# Patient Record
Sex: MALE | Race: ASIAN | NOT HISPANIC OR LATINO | Employment: FULL TIME | URBAN - METROPOLITAN AREA
[De-identification: names, ages, dates, MRNs, and addresses within clinical notes are randomized per-mention and may not be internally consistent; named-entity substitution may affect disease eponyms.]

---

## 2021-09-10 ENCOUNTER — OCCUPATIONAL MEDICINE (OUTPATIENT)
Dept: OCCUPATIONAL MEDICINE | Facility: CLINIC | Age: 36
End: 2021-09-10
Payer: COMMERCIAL

## 2021-09-10 ENCOUNTER — HOSPITAL ENCOUNTER (OUTPATIENT)
Facility: MEDICAL CENTER | Age: 36
End: 2021-09-10
Attending: NURSE PRACTITIONER
Payer: COMMERCIAL

## 2021-09-10 VITALS
WEIGHT: 142.2 LBS | TEMPERATURE: 99.2 F | HEART RATE: 69 BPM | RESPIRATION RATE: 18 BRPM | OXYGEN SATURATION: 94 % | DIASTOLIC BLOOD PRESSURE: 64 MMHG | SYSTOLIC BLOOD PRESSURE: 114 MMHG | HEIGHT: 65 IN | BODY MASS INDEX: 23.69 KG/M2

## 2021-09-10 DIAGNOSIS — Z77.21 EXPOSURE TO BLOOD OR BODY FLUID: ICD-10-CM

## 2021-09-10 PROCEDURE — 80053 COMPREHEN METABOLIC PANEL: CPT

## 2021-09-10 PROCEDURE — 86803 HEPATITIS C AB TEST: CPT

## 2021-09-10 PROCEDURE — 86704 HEP B CORE ANTIBODY TOTAL: CPT

## 2021-09-10 PROCEDURE — 86706 HEP B SURFACE ANTIBODY: CPT

## 2021-09-10 PROCEDURE — 87389 HIV-1 AG W/HIV-1&-2 AB AG IA: CPT

## 2021-09-10 PROCEDURE — 99213 OFFICE O/P EST LOW 20 MIN: CPT | Performed by: NURSE PRACTITIONER

## 2021-09-10 PROCEDURE — 85027 COMPLETE CBC AUTOMATED: CPT

## 2021-09-10 PROCEDURE — 87340 HEPATITIS B SURFACE AG IA: CPT

## 2021-09-10 NOTE — LETTER
"EMPLOYEE’S CLAIM FOR COMPENSATION/ REPORT OF INITIAL TREATMENT  FORM C-4    EMPLOYEE’S CLAIM - PROVIDE ALL INFORMATION REQUESTED   First Name  Riddhi Last Name  Domi Birthdate                    1985                Sex  male Claim Number (Insurer’s Use Only)    Home Address  [address] Age  35 y.o. 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 (if applicable)  Removing needle from patient    How did this injury or occupational disease occur? (Be specific an answer in detail. Use additional sheet if necessary)     If you believe that you have an occupational disease, when did you first have knowledge of the disability and it relationship to your employment?  n/a Witnesses to the Accident  Staff in OR 07      Nature of Injury or Occupational Disease  Puncture  Part(s) of Body Injured or Affected  Finger (R), ,     I certify that the above is true and correct to the best of my knowledge and that I have provided this information in order to obtain the " benefits of [place]  I hereby authorize any physician, chiropractor, surgeon, practitioner, or other person, any hospital, including [facility], any medical service organization, any insurance company, or other institution or organization to release to each other, any medical or other information, including benefits paid or payable, pertinent to this injury or disease, except information relative to diagnosis, treatment and/or counseling for AIDS, psychological conditions, alcohol or controlled substances, for which I must give specific authorization.  A Photostat of this authorization shall be as valid as the original.     Date   Place Employee’s Original or  *Electronic Signature   THIS REPORT MUST BE COMPLETED AND MAILED WITHIN 3 WORKING DAYS OF TREATMENT   Place  [facility]   Date  9/10/2021 Diagnosis and Description of Injury or Occupational Disease  (Z77.21) Exposure to blood or body fluid Is there evidence the injured employee was under the influence of alcohol and/or another controlled substance at the time of accident?  ? No ? Yes (if yes, please explain)    Hour  4:00 PM   The encounter diagnosis was Exposure to blood or body fluid. No   Treatment  None indicated at this time  Have you advised the patient to remain off work five days or     more?    X-Ray Findings      ? Yes Indicate dates:   From   To      From information given by the employee, together with medical evidence, can        you directly connect this injury or occupational disease as job incurred?  Yes ? No If no, is the injured employee capable of:  ? full duty  Yes ? modified duty      Is additional medical care by a physician indicated?  Yes If Modified Duty, Specify any Limitations / Restrictions      Do you know of any previous injury or disease  "contributing to this condition or occupational disease?  ? Yes ? No (Explain if yes)                          No   Date  9/10/2021 Print Health Care Provider's   ADELAIDE Villalobos I certify the employer’s copy of  this form was mailed on:   Address  [address],   [address] Insurer’s Use Only     [place] Zip  [address]    Provider’s Tax ID Number  [id number] Telephone  Dept: [phone]             Health Care Provider’s Original or Electronic Signature  e-SignWHADINA MORFIN Degree (MD,DO, DC,PAZarinaC,APRN)   APRN      * Complete and attach Release of Information (Form C-4A) when injured employee signs C-4 Form electronically  ORIGINAL - TREATING HEALTHCARE PROVIDER PAGE 2 - INSURER/TPA PAGE 3 - EMPLOYER PAGE 4 - EMPLOYEE             Form C-4 (rev.08/21)           BRIEF DESCRIPTION OF RIGHTS AND BENEFITS  (Pursuant to [place]RS 616C.050)    Notice of Injury or Occupational Disease (Incident Report Form C-1): If an injury or occupational disease (OD) arises out of and in the course of employment, you must provide written notice to your employer as soon as practicable, but no later than 7 days after the accident or OD. Your employer shall maintain a sufficient supply of the required forms.    Claim for Compensation (Form C-4): If medical treatment is sought, the form C-4 is available at the place of initial treatment. A completed \"Claim for Compensation\" (Form C-4) must be filed within 90 days after an accident or OD. The treating physician or chiropractor must, within 3 working days after treatment, complete and mail to the employer, the employer's insurer and third-party , the Claim for Compensation.    Medical Treatment: If you require medical treatment for your on-the-job injury or OD, you may be required to select a physician or chiropractor from a list provided by your workers’ compensation insurer, if it has contracted with an Organization for Managed Care (MCO) or Preferred " Provider Organization (PPO) or providers of health care. If your employer has not entered into a contract with an MCO or PPO, you may select a physician or chiropractor from the Panel of Physicians and Chiropractors. Any medical costs related to your industrial injury or OD will be paid by your insurer.    Temporary Total Disability (TTD): If your doctor has certified that you are unable to work for a period of at least 5 consecutive days, or 5 cumulative days in a 20-day period, or places restrictions on you that your employer does not accommodate, you may be entitled to TTD compensation.    Temporary Partial Disability (TPD): If the wage you receive upon reemployment is less than the compensation for TTD to which you are entitled, the insurer may be required to pay you TPD compensation to make up the difference. TPD can only be paid for a maximum of 24 months.    Permanent Partial Disability (PPD): When your medical condition is stable and there is an indication of a PPD as a result of your injury or OD, within 30 days, your insurer must arrange for an evaluation by a rating physician or chiropractor to determine the degree of your PPD. The amount of your PPD award depends on the date of injury, the results of the PPD evaluation, your age and wage.    Permanent Total Disability (PTD): If you are medically certified by a treating physician or chiropractor as permanently and totally disabled and have been granted a PTD status by your insurer, you are entitled to receive monthly benefits not to exceed 66 2/3% of your average monthly wage. The amount of your PTD payments is subject to reduction if you previously received a lump-sum PPD award.    Vocational Rehabilitation Services: You may be eligible for vocational rehabilitation services if you are unable to return to the job due to a permanent physical impairment or permanent restrictions as a result of your injury or occupational disease.    Transportation and Per  Saray Reimbursement: You may be eligible for travel expenses and per saray associated with medical treatment.    Reopening: You may be able to reopen your claim if your condition worsens after claim closure.     Appeal Process: If you disagree with a written determination issued by the insurer or the insurer does not respond to your request, you may appeal to the Department of Administration, , by following the instructions contained in your determination letter. You must appeal the determination within 70 days from the date of the determination letter at [address], [address], [place], [place] [address], or [address], [place], [place] [address]. If you disagree with the  decision, you may appeal to the Department of Administration, . You must file your appeal within 30 days from the date of the  decision letter at [address], [address], [place], [place] [address], or [address], [facility] [address] [facility] [address], [place], [place] [address]. If you disagree with a decision of an , you may file a petition for judicial review with the District Court. You must do so within 30 days of the Appeal Officer’s decision. You may be represented by an  at your own expense or you may contact the [facility] for possible representation.    [place]  for Injured Workers (NAIW): If you disagree with a  decision, you may request that NAIW represent you without charge at an  Hearing. For information regarding denial of benefits, you may contact the [facility] at: [address], [address], [place] [address], [phone], or [address] [facility] [address], [place] [address], [phone]    To File a Complaint with the Division: If you wish to file a complaint with the  of the Division of Industrial Relations (DIR),  please contact the Workers’ Compensation Section, [facility]  [address], [address], [place], [place] [address], telephone [phone], or [address], [address], [place], [place] [address], telephone [phone].    For assistance with Workers’ Compensation Issues: You may contact the [facility] Office for Consumer Health Assistance, [address], [address], [place], [place] [address], Toll Free 1-986.149.9592, Web site: http://[facility].nv.gov/Programs/DASIA E-mail: dasia@[facility].nv.gov              __________________________________________________________________                                    _________________            Employee Name / Signature                                                                                                                            Date                                                                                                                                                                                                                              D-2 (rev. 10/20)

## 2021-09-10 NOTE — LETTER
[address],   [address] [address]  Phone:  [phone] - Fax:  [phone]   Occupational Health [facility] Progress Report and Disability Certification  Date of Service: 9/10/2021   No Show:  No  Date / Time of Next Visit: 9/13/2021 @ 3:45 PM    Claim Information   Patient Name: Riddhi Duron  Claim Number:     Employer:   [employer] Date of Injury: 9/10/2021     Insurer / TPA: [employer] [employer]  ID / SSN:     Occupation: Neurophysiologist  Diagnosis: The encounter diagnosis was Exposure to blood or body fluid.    Medical Information   Related to Industrial Injury? Yes    Subjective Complaints:  DOI 9/10/21. MARCK: Removing a needle from a patient in the OR. Patient here for post exposure needle stick. Patient remains asymptomatic. Baseline labs drawn at this visit. Source labs reviewed and negative for Hep C and HIV.  Risks and benefits of HIV prophylaxis discussed with patient. Due to low risk nature of injury and review of source labs HIV prophylaxis not recommended. Patient is amenable to this. Patient Tdap is up to date. Plan of care discussed with patient.    Objective Findings: Right index finger: Gross deformity or discoloration noted.  Skin is clean, dry, and intact.   Pre-Existing Condition(s):     Assessment:   Initial Visit    Status: Additional Care Required  Permanent Disability:No    Plan:      Diagnostics:      Comments:  Follow-up Monday post exposure lab review  Full duty  Baseline labs drawn  HIV prophylaxis not recommended as source is negative for hepatitis C and HIV    Disability Information   Status: Released to Full Duty    From:  9/10/2021  Through: 9/13/2021 Restrictions are:     Physical Restrictions   Sitting:    Standing:    Stooping:    Bending:      Squatting:    Walking:    Climbing:    Pushing:      Pulling:    Other:    Reaching Above Shoulder (L):   Reaching Above Shoulder (R):       Reaching Below Shoulder (L):     Reaching Below Shoulder (R):      Not to exceed Weight Limits   Carrying(hrs):   Weight Limit(lb):   Lifting(hrs):   Weight  Limit(lb):     Comments:      Repetitive Actions   Hands: i.e. Fine Manipulations from Grasping:     Feet: i.e. Operating Foot Controls:     Driving / Operate Machinery:     Health Care Provider’s Original or Electronic Signature  ADELAIDE Villalobos Health Care Provider’s Original or Electronic Signature    Rafa Glaser MD       Clinic Name / Location: [address],   [address] [address] Clinic Phone Number: Dept: [phone]   Appointment Time: 4:30 Pm Visit Start Time: 4:00 PM   Check-In Time:  3:54 Pm Visit Discharge Time: 4:22 PM    Original-Treating Physician or Chiropractor    Page 2-Insurer/TPA    Page 3-Employer    Page 4-Employee

## 2021-09-10 NOTE — PROGRESS NOTES
"Subjective:     Riddhi Duron is a 35 y.o. male who presents for Other (Pt. got a  used needle stick on his Rt. index finger as he removed needle electrodes from his paitnet. DOI: 9/10/21 12:10pm (the end of a surgery))      DOI 9/10/21. MARCK: Removing a needle from a patient in the OR. Patient here for post exposure needle stick. Patient remains asymptomatic. Baseline labs drawn at this visit. Source labs reviewed and negative for Hep C and HIV.  Risks and benefits of HIV prophylaxis discussed with patient. Due to low risk nature of injury and review of source labs HIV prophylaxis not recommended. Patient is amenable to this. Patient Tdap is up to date. Plan of care discussed with patient. Patient would still like to continue with baseline lab testing. Source MRN [id number] Negative for Hep C and HIV.    ROS: All systems were reviewed on intake form, form was reviewed and signed. See scanned documents in media. Pertinent positives and negatives included in HPI.    PMH: No pertinent past medical history to this problem  MEDS: Medications were reviewed in Epic  ALLERGIES: No Known Allergies  SOCHX: Works as Neurophysiologist at [employer]  FH: No pertinent family history to this problem       Objective:     /64 (BP Location: Left arm, Patient Position: Sitting, BP Cuff Size: Adult long)   Pulse 69   Temp 37.3 °C (99.2 °F) (Temporal)   Resp 18   Ht 1.65 m (5' 4.96\")   Wt 64.5 kg (142 lb 3.2 oz)   SpO2 94%   BMI 23.69 kg/m²     [unfilled]    Right index finger: Gross deformity or discoloration noted.  Skin is clean, dry, and intact.    Assessment/Plan:       1. Exposure to blood or body fluid  - EXPOSED PERSON-SOURCE PT POSITIVE OR UNK (BLOOD & BODY FLUID EXPOSURE); Future    Released to Full Duty FROM 9/10/2021 TO 9/13/2021     Follow-up Monday post exposure lab review  Full duty  Baseline labs drawn  HIV prophylaxis not recommended as source is negative for hepatitis C and HIV    Differential diagnosis, " natural history, supportive care, and indications for immediate follow-up discussed.    Approximately 25 minutes were spent in reviewing notes, preparing for visit, obtaining history, exam and evaluation, patient counseling/education and post visit documentation/orders.

## 2021-09-11 LAB
ALBUMIN SERPL BCP-MCNC: 4.6 G/DL (ref 3.2–4.9)
ALBUMIN/GLOB SERPL: 1.6 G/DL
ALP SERPL-CCNC: 51 U/L (ref 30–99)
ALT SERPL-CCNC: 10 U/L (ref 2–50)
ANION GAP SERPL CALC-SCNC: 12 MMOL/L (ref 7–16)
AST SERPL-CCNC: 14 U/L (ref 12–45)
BILIRUB SERPL-MCNC: 0.8 MG/DL (ref 0.1–1.5)
BUN SERPL-MCNC: 12 MG/DL (ref 8–22)
CALCIUM SERPL-MCNC: 9.6 MG/DL (ref 8.5–10.5)
CHLORIDE SERPL-SCNC: 102 MMOL/L (ref 96–112)
CO2 SERPL-SCNC: 25 MMOL/L (ref 20–33)
CREAT SERPL-MCNC: 0.82 MG/DL (ref 0.5–1.4)
ERYTHROCYTE [DISTWIDTH] IN BLOOD BY AUTOMATED COUNT: 42.3 FL (ref 35.9–50)
GLOBULIN SER CALC-MCNC: 2.8 G/DL (ref 1.9–3.5)
GLUCOSE SERPL-MCNC: 85 MG/DL (ref 65–99)
HBV CORE AB SERPL QL IA: NONREACTIVE
HBV SURFACE AB SERPL IA-ACNC: 77.9 MIU/ML (ref 0–10)
HBV SURFACE AG SER QL: ABNORMAL
HCT VFR BLD AUTO: 43.7 % (ref 42–52)
HCV AB SER QL: ABNORMAL
HGB BLD-MCNC: 14.5 G/DL (ref 14–18)
HIV 1+2 AB+HIV1 P24 AG SERPL QL IA: ABNORMAL
MCH RBC QN AUTO: 30.5 PG (ref 27–33)
MCHC RBC AUTO-ENTMCNC: 33.2 G/DL (ref 33.7–35.3)
MCV RBC AUTO: 91.8 FL (ref 81.4–97.8)
PLATELET # BLD AUTO: 228 K/UL (ref 164–446)
PMV BLD AUTO: 11.2 FL (ref 9–12.9)
POTASSIUM SERPL-SCNC: 3.8 MMOL/L (ref 3.6–5.5)
PROT SERPL-MCNC: 7.4 G/DL (ref 6–8.2)
RBC # BLD AUTO: 4.76 M/UL (ref 4.7–6.1)
SODIUM SERPL-SCNC: 139 MMOL/L (ref 135–145)
WBC # BLD AUTO: 5.4 K/UL (ref 4.8–10.8)

## 2021-09-13 ENCOUNTER — OCCUPATIONAL MEDICINE (OUTPATIENT)
Dept: OCCUPATIONAL MEDICINE | Facility: CLINIC | Age: 36
End: 2021-09-13
Payer: COMMERCIAL

## 2021-09-13 VITALS — WEIGHT: 142 LBS | BODY MASS INDEX: 24.24 KG/M2 | HEIGHT: 64 IN

## 2021-09-13 DIAGNOSIS — Z77.21 EXPOSURE TO BLOOD OR BODY FLUID: ICD-10-CM

## 2021-09-13 PROCEDURE — 99212 OFFICE O/P EST SF 10 MIN: CPT | Performed by: PREVENTIVE MEDICINE

## 2021-09-13 ASSESSMENT — FIBROSIS 4 INDEX: FIB4 SCORE: 0.64

## 2021-09-13 NOTE — LETTER
[address],   [address] [address]  Phone:  [phone] - Fax:  [phone]   Occupational Health [facility] Progress Report and Disability Certification  Date of Service: 9/13/2021   No Show:  No  Date / Time of Next Visit: 1/13/2022 @ 3:30 PM    Claim Information   Patient Name: Riddhi Duron  Claim Number:     Employer:   [employer] Date of Injury:      Insurer / TPA: [employer]  ID / SSN:     Occupation:  NEUROPHYSIOLOGIST Diagnosis: The encounter diagnosis was Exposure to blood or body fluid.    Medical Information   Related to Industrial Injury? Yes    Subjective Complaints:  DOI 9/10/2021: 36 yo injured worker presents with needlestick injury. MARCK: Removing a needle from a patient in the OR. Source negative. Patient currently asymptomatic here for lab follow up.   Objective Findings: Constitutional: Patient is in no acute distress. Appears well-developed and well-nourished.   HENT: Normocephalic and atraumatic. EOM are normal. No scleral icterus.   Cardiovascular: Normal rate.    Pulmonary/Chest: Effort normal. No respiratory distress.   Neurological: Patient is alert and oriented to person, place, and time.   Skin: Skin is warm and dry.   Psychiatric: Normal mood and affect. Behavior is normal.      Pre-Existing Condition(s):     Assessment:   Condition Same    Status: Additional Care Required  Permanent Disability:No    Plan:      Diagnostics:      Comments:  Source status negative for HIV, Hep B and Hep C  Baseline labs within normal limits. Negative for HIV, Hep B and Hep C. Titers indicate patient immune to Hepatitis B  Patient would like to continue with lab work despite negative testing.  Will repeat   HIV and hepatitis C at 4 months post exposure  Tetanus is up-to-date  Ordered hepatitis C and HIV  Follow-up 4 months, have blood drawn 1 week before next appointment      Disability Information   Status: Released to Full Duty    From:   9/13/2021  Through: 1/13/2022 Restrictions are:     Physical Restrictions   Sitting:    Standing:    Stooping:    Bending:      Squatting:    Walking:    Climbing:    Pushing:      Pulling:    Other:    Reaching Above Shoulder (L):   Reaching Above Shoulder (R):       Reaching Below Shoulder (L):    Reaching Below Shoulder (R):      Not to exceed Weight Limits   Carrying(hrs):   Weight Limit(lb):   Lifting(hrs):   Weight  Limit(lb):     Comments:      Repetitive Actions   Hands: i.e. Fine Manipulations from Grasping:     Feet: i.e. Operating Foot Controls:     Driving / Operate Machinery:     Health Care Provider’s Original or Electronic Signature  Raffi Khalil D.O. Health Care Provider’s Original or Electronic Signature    Rafa Glaser MD       Clinic Name / Location: [address] [address] Clinic Phone Number: Dept: [phone]   Appointment Time: 3:45 Pm Visit Start Time: 3:40 PM   Check-In Time:  3:34 Pm Visit Discharge Time: 4:10 PM    Original-Treating Physician or Chiropractor    Page 2-Insurer/TPA    Page 3-Employer    Page 4-Employee

## 2021-09-13 NOTE — PROGRESS NOTES
"Subjective:     Riddhi Duron is a 35 y.o. 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 MARCK: Removing a needle from a patient in the OR. Source negative. Patient currently asymptomatic here for lab follow up.    ROS       Objective:     Ht 1.626 m (5' 4\")   Wt 64.4 kg (142 lb)   BMI 24.37 kg/m²      Constitutional: Patient is in no acute distress. Appears well-developed and well-nourished.   HENT: Normocephalic and atraumatic. EOM are normal. No scleral icterus.   Cardiovascular: Normal rate.    Pulmonary/Chest: Effort normal. No respiratory distress.   Neurological: Patient is alert and oriented to person, place, and time.   Skin: Skin is warm and dry.   Psychiatric: Normal mood and affect. Behavior is normal.       Assessment/Plan:       1. Exposure to blood or body fluid    Released to Full Duty FROM 9/13/2021 TO 1/13/2022     Source status negative for HIV, Hep B and Hep C  Baseline labs within normal limits. Negative for HIV, Hep B and Hep C. Titers indicate patient immune to Hepatitis B  Patient would like to continue with lab work despite negative testing.  Will repeat   HIV and hepatitis C at 4 months post exposure  Tetanus is up-to-date  Ordered hepatitis C and HIV  Follow-up 4 months, have blood drawn 1 week before next appointment      Differential diagnosis, natural history, supportive care, and indications for immediate follow-up discussed.    Approximately 15 minutes was spent in preparing for visit, obtaining history, exam and evaluation, patient counseling/education and post visit documentation/orders.  "

## 2021-10-21 ENCOUNTER — HOSPITAL ENCOUNTER (OUTPATIENT)
Dept: LAB | Facility: MEDICAL CENTER | Age: 36
End: 2021-10-21
Attending: PREVENTIVE MEDICINE
Payer: COMMERCIAL

## 2021-10-21 DIAGNOSIS — Z77.21 EXPOSURE TO BLOOD OR BODY FLUID: ICD-10-CM

## 2021-10-21 LAB
HCV AB SER QL: NORMAL
HIV 1+2 AB+HIV1 P24 AG SERPL QL IA: NORMAL

## 2021-10-21 PROCEDURE — 86803 HEPATITIS C AB TEST: CPT

## 2021-10-21 PROCEDURE — 36415 COLL VENOUS BLD VENIPUNCTURE: CPT

## 2021-10-21 PROCEDURE — 87389 HIV-1 AG W/HIV-1&-2 AB AG IA: CPT

## 2021-10-28 ENCOUNTER — TELEPHONE (OUTPATIENT)
Dept: SCHEDULING | Facility: IMAGING CENTER | Age: 36
End: 2021-10-28

## 2021-10-29 ENCOUNTER — TELEPHONE (OUTPATIENT)
Dept: SCHEDULING | Facility: IMAGING CENTER | Age: 36
End: 2021-10-29

## 2021-11-08 ENCOUNTER — APPOINTMENT (OUTPATIENT)
Dept: MEDICAL GROUP | Facility: PHYSICIAN GROUP | Age: 36
End: 2021-11-08
Payer: COMMERCIAL

## 2021-11-10 ENCOUNTER — OFFICE VISIT (OUTPATIENT)
Dept: MEDICAL GROUP | Facility: PHYSICIAN GROUP | Age: 36
End: 2021-11-10
Payer: COMMERCIAL

## 2021-11-10 VITALS
BODY MASS INDEX: 22.8 KG/M2 | WEIGHT: 141.9 LBS | TEMPERATURE: 99.3 F | HEART RATE: 64 BPM | DIASTOLIC BLOOD PRESSURE: 50 MMHG | OXYGEN SATURATION: 99 % | SYSTOLIC BLOOD PRESSURE: 98 MMHG | HEIGHT: 66 IN

## 2021-11-10 DIAGNOSIS — Z13.21 ENCOUNTER FOR VITAMIN DEFICIENCY SCREENING: ICD-10-CM

## 2021-11-10 DIAGNOSIS — Z13.29 SCREENING FOR THYROID DISORDER: ICD-10-CM

## 2021-11-10 DIAGNOSIS — Z13.220 LIPID SCREENING: ICD-10-CM

## 2021-11-10 DIAGNOSIS — Z00.00 HEALTH CARE MAINTENANCE: ICD-10-CM

## 2021-11-10 DIAGNOSIS — R10.13 EPIGASTRIC PAIN: ICD-10-CM

## 2021-11-10 DIAGNOSIS — Z13.1 DIABETES MELLITUS SCREENING: ICD-10-CM

## 2021-11-10 DIAGNOSIS — R14.0 ABDOMINAL BLOATING: ICD-10-CM

## 2021-11-10 DIAGNOSIS — Z72.820 SLEEP DEFICIENT: ICD-10-CM

## 2021-11-10 PROCEDURE — 99204 OFFICE O/P NEW MOD 45 MIN: CPT | Performed by: INTERNAL MEDICINE

## 2021-11-10 ASSESSMENT — FIBROSIS 4 INDEX: FIB4 SCORE: 0.64

## 2021-11-10 ASSESSMENT — PATIENT HEALTH QUESTIONNAIRE - PHQ9: CLINICAL INTERPRETATION OF PHQ2 SCORE: 0

## 2021-11-11 NOTE — PROGRESS NOTES
"New Patient to establish    Chief Complaint   Patient presents with   • Establish Care   • GI Problem     x11 months       Subjective:     History of Present Illness: Patient is a 35 y.o. male who is here today to establish primary care    1. Epigastric pain  2. Abdominal bloating  Reported going on for almost 1 year, epigastric pain mainly after meals, sometimes without milk, associated with abdominal bloating, burping, reported possibly lactose intolerance with passing gases, patient denied having alarm GI signs or symptoms, reported had colonoscopy 3 years ago which was normal, would like to be seen by GI for possible upper endoscopy to rule out peptic ulcer disease    Sleep deficiency  Reported busy with 2 jobs currently    No current outpatient medications on file prior to visit.     No current facility-administered medications on file prior to visit.     No Known Allergies  Patient Active Problem List    Diagnosis Date Noted   • Epigastric pain 11/10/2021   • Abdominal bloating 11/10/2021     No past medical history on file.  Past Surgical History:   Procedure Laterality Date   • OTHER  2013    Septoplasty DX: Deviated septum     Family History   Problem Relation Age of Onset   • Hypertension Father      Social History     Tobacco Use   • Smoking status: Never Smoker   • Smokeless tobacco: Never Used   Vaping Use   • Vaping Use: Never used   Substance Use Topics   • Alcohol use: Never   • Drug use: Never       ROS:  All other systems reviewed and are negative except as stated in the HPI       Physical Exam:     BP (!) 98/50 (BP Location: Left arm, Patient Position: Sitting, BP Cuff Size: Adult)   Pulse 64   Temp 37.4 °C (99.3 °F) (Temporal)   Ht 1.676 m (5' 6\")   Wt 64.4 kg (141 lb 14.4 oz)   SpO2 99%   BMI 22.90 kg/m²   General: Normal appearing. No distress.  Pulmonary: Clear to ausculation.  Normal effort.   Cardiovascular: Regular rate and rhythm    Assessment and Plan:     1. Epigastric pain  2. " Abdominal bloating  - CRP HIGH SENSITIVE (CARDIAC); Future  - MAGNESIUM; Future  - ENDOMYSIAL AB IGA; Future  - INFLAMMATORY BOWEL DISEASE DIFF PANEL; Future  - LACTOSE TOLERANCE TEST  - Miscellaneous Test; Future  - PANCREATIC ELASTASE, FECAL; Future  - T-TG IGG; Future  - T-TRANSGLUTAMINASE (TTG) IGA; Future  - H.PYLORI STOOL ANTIGEN; Future  - LIPASE; Future  - US-ABDOMEN COMPLETE SURVEY; Future  -GI referral for upper endoscopy    3. Screening for thyroid disorder  - FREE THYROXINE; Future  - TSH; Future  - T3 FREE; Future  - THYROID PEROXIDASE  (TPO) AB; Future    4. Encounter for vitamin deficiency screening  - VITAMIN D,25 HYDROXY; Future  - VITAMIN B12; Future    5. Health care maintenance  - MAGNESIUM; Future  6. Diabetes mellitus screening  - HEMOGLOBIN A1C; Future  - INSULIN FASTING; Future    7. Lipid screening  - LipoFit by NMR; Future    Follow Up:      Return in about 6 weeks (around 12/22/2021).    Please note that this dictation was created using voice recognition software. I have made every reasonable attempt to correct obvious errors, but I expect that there are errors of grammar and possibly content that I did not discover before finalizing the note.    Signed by: Drea Shepard M.D.

## 2021-11-29 ENCOUNTER — HOSPITAL ENCOUNTER (OUTPATIENT)
Dept: LAB | Facility: MEDICAL CENTER | Age: 36
End: 2021-11-29
Attending: INTERNAL MEDICINE
Payer: COMMERCIAL

## 2021-11-29 DIAGNOSIS — Z00.00 HEALTH CARE MAINTENANCE: ICD-10-CM

## 2021-11-29 DIAGNOSIS — Z13.29 SCREENING FOR THYROID DISORDER: ICD-10-CM

## 2021-11-29 DIAGNOSIS — Z13.21 ENCOUNTER FOR VITAMIN DEFICIENCY SCREENING: ICD-10-CM

## 2021-11-29 DIAGNOSIS — R10.13 EPIGASTRIC PAIN: ICD-10-CM

## 2021-11-29 DIAGNOSIS — Z13.1 DIABETES MELLITUS SCREENING: ICD-10-CM

## 2021-11-29 DIAGNOSIS — Z13.220 LIPID SCREENING: ICD-10-CM

## 2021-11-29 DIAGNOSIS — R14.0 ABDOMINAL BLOATING: ICD-10-CM

## 2021-11-29 LAB
CRP SERPL HS-MCNC: 0.3 MG/L (ref 0–3)
EST. AVERAGE GLUCOSE BLD GHB EST-MCNC: 108 MG/DL
HBA1C MFR BLD: 5.4 % (ref 4–5.6)
LIPASE SERPL-CCNC: 24 U/L (ref 11–82)
MAGNESIUM SERPL-MCNC: 2.1 MG/DL (ref 1.5–2.5)
T3FREE SERPL-MCNC: 2.7 PG/ML (ref 2–4.4)
T4 FREE SERPL-MCNC: 1.4 NG/DL (ref 0.93–1.7)
THYROPEROXIDASE AB SERPL-ACNC: <9 IU/ML (ref 0–9)
TSH SERPL DL<=0.005 MIU/L-ACNC: 1.8 UIU/ML (ref 0.38–5.33)
VIT B12 SERPL-MCNC: 655 PG/ML (ref 211–911)

## 2021-11-29 PROCEDURE — 83516 IMMUNOASSAY NONANTIBODY: CPT

## 2021-11-29 PROCEDURE — 84439 ASSAY OF FREE THYROXINE: CPT

## 2021-11-29 PROCEDURE — 86255 FLUORESCENT ANTIBODY SCREEN: CPT

## 2021-11-29 PROCEDURE — 86671 FUNGUS NES ANTIBODY: CPT

## 2021-11-29 PROCEDURE — 83735 ASSAY OF MAGNESIUM: CPT

## 2021-11-29 PROCEDURE — 83525 ASSAY OF INSULIN: CPT

## 2021-11-29 PROCEDURE — 82607 VITAMIN B-12: CPT

## 2021-11-29 PROCEDURE — 80061 LIPID PANEL: CPT

## 2021-11-29 PROCEDURE — 84481 FREE ASSAY (FT-3): CPT

## 2021-11-29 PROCEDURE — 86141 C-REACTIVE PROTEIN HS: CPT

## 2021-11-29 PROCEDURE — 83690 ASSAY OF LIPASE: CPT

## 2021-11-29 PROCEDURE — 84443 ASSAY THYROID STIM HORMONE: CPT

## 2021-11-29 PROCEDURE — 86376 MICROSOMAL ANTIBODY EACH: CPT

## 2021-11-29 PROCEDURE — 36415 COLL VENOUS BLD VENIPUNCTURE: CPT

## 2021-11-29 PROCEDURE — 83036 HEMOGLOBIN GLYCOSYLATED A1C: CPT

## 2021-11-29 PROCEDURE — 83704 LIPOPROTEIN BLD QUAN PART: CPT

## 2021-11-29 PROCEDURE — 82306 VITAMIN D 25 HYDROXY: CPT

## 2021-12-01 ENCOUNTER — HOSPITAL ENCOUNTER (OUTPATIENT)
Facility: MEDICAL CENTER | Age: 36
End: 2021-12-01
Attending: INTERNAL MEDICINE
Payer: COMMERCIAL

## 2021-12-01 DIAGNOSIS — R14.0 ABDOMINAL BLOATING: ICD-10-CM

## 2021-12-01 DIAGNOSIS — R10.13 EPIGASTRIC PAIN: ICD-10-CM

## 2021-12-01 LAB
H PYLORI AG STL QL IA: NOT DETECTED
INSULIN P FAST SERPL-ACNC: 2 UIU/ML (ref 3–25)

## 2021-12-01 PROCEDURE — 87338 HPYLORI STOOL AG IA: CPT

## 2021-12-01 PROCEDURE — 83520 IMMUNOASSAY QUANT NOS NONAB: CPT

## 2021-12-02 LAB
25(OH)D3 SERPL-MCNC: 24 NG/ML (ref 30–80)
TTG IGA SER IA-ACNC: <2 U/ML (ref 0–3)
TTG IGG SER IA-ACNC: 3 U/ML (ref 0–5)

## 2021-12-03 LAB
ANCA IFA PATTERN Q6048: NORMAL
ANCA IFA TITER Q6047: NORMAL
BAKER'S YEAST IGA QN IA: 3.5 UNITS (ref 0–24.9)
BAKER'S YEAST IGG QN IA: 5.8 UNITS (ref 0–24.9)
CHOLEST SERPL-MCNC: 191 MG/DL
EER INFLAMMATORY BOWEL DISEASES Q6049: NORMAL
ELASTASE PANC STL-MCNT: >800 UG/G
HDL PARTICAL NO Q4363: 29.6 UMOL/L
HDL SIZE Q4361: 9.8 NM
HDLC SERPL-MCNC: 68 MG/DL (ref 40–59)
HLD.LARGE SERPL-SCNC: 11.1 UMOL/L
IBD INTERP Q0082: NORMAL
L VLDL PART NO Q4357: 1.7 NMOL/L
LDL SERPL QN: 21.5 NM
LDL SERPL-SCNC: 929 NMOL/L
LDL SMALL SERPL-SCNC: 165 NMOL/L
LDLC SERPL CALC-MCNC: 110 MG/DL
PATHOLOGY STUDY: ABNORMAL
TRIGL SERPL-MCNC: 63 MG/DL (ref 30–149)
VLDL SIZE Q4362: 47.9 NM

## 2021-12-09 ENCOUNTER — HOSPITAL ENCOUNTER (OUTPATIENT)
Dept: LAB | Facility: MEDICAL CENTER | Age: 36
End: 2021-12-09
Attending: INTERNAL MEDICINE
Payer: COMMERCIAL

## 2021-12-09 DIAGNOSIS — R10.13 EPIGASTRIC PAIN: ICD-10-CM

## 2021-12-09 DIAGNOSIS — R14.0 ABDOMINAL BLOATING: ICD-10-CM

## 2021-12-09 PROCEDURE — 91065 BREATH HYDROGEN/METHANE TEST: CPT | Mod: 91

## 2021-12-09 PROCEDURE — 36415 COLL VENOUS BLD VENIPUNCTURE: CPT

## 2021-12-10 ENCOUNTER — HOSPITAL ENCOUNTER (OUTPATIENT)
Dept: LAB | Facility: MEDICAL CENTER | Age: 36
End: 2021-12-10
Attending: INTERNAL MEDICINE
Payer: COMMERCIAL

## 2021-12-10 PROCEDURE — 82951 GLUCOSE TOLERANCE TEST (GTT): CPT

## 2021-12-10 PROCEDURE — 82952 GTT-ADDED SAMPLES: CPT

## 2021-12-10 PROCEDURE — 36415 COLL VENOUS BLD VENIPUNCTURE: CPT

## 2021-12-14 ENCOUNTER — APPOINTMENT (OUTPATIENT)
Dept: MEDICAL GROUP | Facility: PHYSICIAN GROUP | Age: 36
End: 2021-12-14
Payer: COMMERCIAL

## 2021-12-15 ENCOUNTER — HOSPITAL ENCOUNTER (OUTPATIENT)
Facility: MEDICAL CENTER | Age: 36
End: 2021-12-15
Attending: INTERNAL MEDICINE
Payer: COMMERCIAL

## 2021-12-15 ENCOUNTER — OFFICE VISIT (OUTPATIENT)
Dept: MEDICAL GROUP | Facility: PHYSICIAN GROUP | Age: 36
End: 2021-12-15
Payer: COMMERCIAL

## 2021-12-15 VITALS
HEIGHT: 65 IN | SYSTOLIC BLOOD PRESSURE: 106 MMHG | TEMPERATURE: 98.8 F | OXYGEN SATURATION: 98 % | HEART RATE: 79 BPM | BODY MASS INDEX: 22.64 KG/M2 | WEIGHT: 135.9 LBS | DIASTOLIC BLOOD PRESSURE: 62 MMHG

## 2021-12-15 DIAGNOSIS — E55.9 VITAMIN D DEFICIENCY: ICD-10-CM

## 2021-12-15 DIAGNOSIS — J34.89 NOSE DRYNESS: ICD-10-CM

## 2021-12-15 DIAGNOSIS — J34.2 NASAL SEPTAL DEVIATION: ICD-10-CM

## 2021-12-15 DIAGNOSIS — Z12.5 ENCOUNTER FOR SCREENING FOR MALIGNANT NEOPLASM OF PROSTATE: ICD-10-CM

## 2021-12-15 PROCEDURE — 99214 OFFICE O/P EST MOD 30 MIN: CPT | Performed by: INTERNAL MEDICINE

## 2021-12-15 PROCEDURE — 87640 STAPH A DNA AMP PROBE: CPT

## 2021-12-15 PROCEDURE — 87641 MR-STAPH DNA AMP PROBE: CPT

## 2021-12-15 ASSESSMENT — FIBROSIS 4 INDEX: FIB4 SCORE: 0.66

## 2021-12-16 LAB
SCCMEC + MECA PNL NOSE NAA+PROBE: NEGATIVE
SCCMEC + MECA PNL NOSE NAA+PROBE: POSITIVE
TEST NAME 95000: NORMAL

## 2021-12-16 NOTE — PROGRESS NOTES
"Established Patient    Chief Complaint   Patient presents with   • Annual Exam       Subjective:     HPI:   Riddhi presents today with the following.    Patient has lab with evidence of vitamin D deficiency, A1c in CRISP recently to prediabetes, recent follow-up is fine    Patient Active Problem List    Diagnosis Date Noted   • Nasal septal deviation 12/15/2021   • Nose dryness 12/15/2021   • Vitamin D deficiency 12/15/2021   • Epigastric pain 11/10/2021   • Abdominal bloating 11/10/2021   • Sleep deficient 11/10/2021       No current outpatient medications on file prior to visit.     No current facility-administered medications on file prior to visit.       Allergies, past medical history, past surgical history, family history, social history reviewed and updated    ROS:  All other systems reviewed and are negative except as stated in the HPI       Physical Exam:     /62 (BP Location: Left arm, Patient Position: Sitting, BP Cuff Size: Adult)   Pulse 79   Temp 37.1 °C (98.8 °F) (Temporal)   Ht 1.663 m (5' 5.47\")   Wt 61.6 kg (135 lb 14.4 oz)   SpO2 98%   BMI 22.29 kg/m²   General: Normal appearing. No distress.  Pulmonary: Clear to ausculation.  Normal effort.   Cardiovascular: Regular rate and rhythm    Assessment and Plan:     36 y.o. male with the following issues.    1. Nasal septal deviation  Reported nasal septal depression, also dry nose chronically, with bleeding  - Referral to ENT    2. Encounter for screening for malignant neoplasm of prostate  - PROSTATE SPECIFIC AG>> would like to check his prostate, reported family history of prostate cancer with his father    3. Nose dryness  - S. Aureus By PCR, Nasal Complete; Future    4. Vitamin D deficiency  Educated regarding vitamin D supplement    >> Pending GI  breathing labs    Follow Up:      Return in about 4 weeks (around 1/12/2022).    Please note that this dictation was created using voice recognition software. I have made every reasonable " attempt to correct obvious errors, but I expect that there are errors of grammar and possibly content that I did not discover before finalizing the note.    Signed by: Drea Shepard M.D.

## 2021-12-16 NOTE — PATIENT INSTRUCTIONS
"Natural vitamins from whole foods  Active vitamin B complex supplement (methylcobalamin B12, methyl folate B9).   Magnesium glycinate supplement  400 mg daily  Vitamin D3 5000 units daily with K2 supplement   Fish oil (cod liver oil)  Turmeric, hesham, Boswellia, black pepper, Cinnamon combination supplement   Grounding >> watch \"The Earthing Movie with Yevgeniy Syd\".   Green Tea Matcha Organic     "

## 2021-12-23 LAB — TEST NAME 95000: NORMAL

## 2021-12-27 DIAGNOSIS — Z77.018 SUSPECTED EXPOSURE TO HEAVY METALS: ICD-10-CM

## 2021-12-29 ENCOUNTER — HOSPITAL ENCOUNTER (OUTPATIENT)
Dept: LAB | Facility: MEDICAL CENTER | Age: 36
End: 2021-12-29
Attending: INTERNAL MEDICINE
Payer: COMMERCIAL

## 2021-12-29 DIAGNOSIS — Z77.018 SUSPECTED EXPOSURE TO HEAVY METALS: ICD-10-CM

## 2021-12-29 PROCEDURE — 36415 COLL VENOUS BLD VENIPUNCTURE: CPT

## 2021-12-29 PROCEDURE — 83655 ASSAY OF LEAD: CPT

## 2021-12-29 PROCEDURE — 82300 ASSAY OF CADMIUM: CPT

## 2021-12-29 PROCEDURE — 83825 ASSAY OF MERCURY: CPT

## 2021-12-29 PROCEDURE — 82175 ASSAY OF ARSENIC: CPT

## 2021-12-31 ENCOUNTER — HOSPITAL ENCOUNTER (OUTPATIENT)
Facility: MEDICAL CENTER | Age: 36
End: 2021-12-31
Attending: INTERNAL MEDICINE
Payer: COMMERCIAL

## 2021-12-31 DIAGNOSIS — Z77.018 SUSPECTED EXPOSURE TO HEAVY METALS: ICD-10-CM

## 2021-12-31 PROCEDURE — 84630 ASSAY OF ZINC: CPT

## 2021-12-31 PROCEDURE — 82300 ASSAY OF CADMIUM: CPT

## 2021-12-31 PROCEDURE — 82175 ASSAY OF ARSENIC: CPT

## 2021-12-31 PROCEDURE — 82570 ASSAY OF URINE CREATININE: CPT

## 2021-12-31 PROCEDURE — 83825 ASSAY OF MERCURY: CPT

## 2021-12-31 PROCEDURE — 82525 ASSAY OF COPPER: CPT

## 2021-12-31 PROCEDURE — 83655 ASSAY OF LEAD: CPT

## 2022-01-02 LAB
ARSENIC BLD-MCNC: <10 UG/L
CADMIUM BLD-MCNC: <1 UG/L
LEAD BLDV-MCNC: <2 UG/DL
MERCURY BLD-MCNC: <2.5 UG/L

## 2022-01-06 DIAGNOSIS — Z77.21 EXPOSURE TO BLOOD OR BODY FLUID: ICD-10-CM

## 2022-01-08 LAB
ARSENIC 24H UR-MCNC: 30.3 UG/L (ref 0–34.9)
ARSENIC 24H UR-MRATE: 72.7 UG/D (ref 0–49.9)
ARSENIC ORGANIC UR-SCNC: 28.6 UG/L
ARSENIC.METHYLATED UR-MCNC: <10 UG/L
ARSENIC/CREAT 24H UR: 45.2 UG/G CRT (ref 0–29.9)
COLLECT DURATION TIME SPEC: 24 H
COPPER 24H UR-MRATE: ABNORMAL UG/D (ref 3–45)
COPPER ?TM UR-MCNC: <1 UG/DL
COPPER/CREAT 24H UR: ABNORMAL UG/G CRT (ref 10–45)
CREAT 24H UR-MCNC: 67 MG/DL
CREAT 24H UR-MRATE: 1608 MG/D (ref 1000–2500)
INORG ARSENIC UR-MCNC: <10 UG/L
LEAD 24H UR-MCNC: <5 UG/L (ref 0–5)
LEAD 24H UR-MRATE: ABNORMAL UG/D (ref 0–8.1)
LEAD/CREAT 24H UR: ABNORMAL UG/G CRT (ref 0–5)
MERCURY 24H UR-MCNC: <2.5 UG/L (ref 0–5)
MERCURY 24H UR-MRATE: ABNORMAL UG/D (ref 0–20)
MERCURY/CREAT 24H UR: ABNORMAL UG/G CRT (ref 0–20)
TOTAL VOLUME 1105: 2400 ML
ZINC 24H UR-MCNC: 16.6 UG/DL (ref 15–120)
ZINC 24H UR-MRATE: 398.4 UG/D (ref 150–1200)
ZINC/CREAT 24H UR: 247.8 UG/G CRT (ref 110–750)

## 2022-09-08 ENCOUNTER — TELEPHONE (OUTPATIENT)
Dept: MEDICAL GROUP | Facility: PHYSICIAN GROUP | Age: 37
End: 2022-09-08
Payer: COMMERCIAL

## 2022-09-09 NOTE — TELEPHONE ENCOUNTER
"Dr. Shepard sent a msg, per pt they are being \"double\" billed. Called pt and LVM. This is not my area of expertise and they will have to speak with someone in the [facility] office. Gave them the telephone number to call- [phone].  "

## 2022-11-23 ENCOUNTER — TELEPHONE (OUTPATIENT)
Dept: MEDICAL GROUP | Facility: PHYSICIAN GROUP | Age: 37
End: 2022-11-23
Payer: COMMERCIAL

## 2022-11-23 NOTE — TELEPHONE ENCOUNTER
VOICEMAIL  1. Caller Name: Riddhi Duron                      Call Back Number: [phone] (home)     2. Message: Patient is calling to dispute a charge on his account regarding a duplicated breath test with dup charge of CPT code 91597, patient called billing and they told him that we would have to delete the duplicate test if patient did not do it. Patient informed me that he only did one breath test and is not sure why he is getting charged for another breath test. I called billing to see where the duplicate test is on our end so we can get that resolved. I spoke to Viet from billing and he insured me that a nurse will take a look at this case. Patient would like to get this resolved as soon as possible since he has been fighting this for the year of 2022.    3. Patient approves office to leave a detailed voicemail/MyChart message: yes

## 2023-06-12 ENCOUNTER — DOCUMENTATION (OUTPATIENT)
Dept: HEALTH INFORMATION MANAGEMENT | Facility: OTHER | Age: 38
End: 2023-06-12
Payer: COMMERCIAL

## 2023-06-12 ENCOUNTER — PATIENT MESSAGE (OUTPATIENT)
Dept: HEALTH INFORMATION MANAGEMENT | Facility: OTHER | Age: 38
End: 2023-06-12

## 2023-08-10 ENCOUNTER — TELEPHONE (OUTPATIENT)
Dept: HEALTH INFORMATION MANAGEMENT | Facility: OTHER | Age: 38
End: 2023-08-10
Payer: COMMERCIAL